# Patient Record
Sex: FEMALE | Race: WHITE | NOT HISPANIC OR LATINO | Employment: UNEMPLOYED | URBAN - NONMETROPOLITAN AREA
[De-identification: names, ages, dates, MRNs, and addresses within clinical notes are randomized per-mention and may not be internally consistent; named-entity substitution may affect disease eponyms.]

---

## 2023-05-18 ENCOUNTER — TRANSCRIBE ORDERS (OUTPATIENT)
Dept: PHYSICAL THERAPY | Facility: CLINIC | Age: 23
End: 2023-05-18

## 2023-05-18 DIAGNOSIS — M25.511 RIGHT SHOULDER PAIN, UNSPECIFIED CHRONICITY: Primary | ICD-10-CM

## 2023-05-23 ENCOUNTER — TREATMENT (OUTPATIENT)
Dept: PHYSICAL THERAPY | Facility: CLINIC | Age: 23
End: 2023-05-23
Payer: COMMERCIAL

## 2023-05-23 DIAGNOSIS — M25.611 DECREASED ROM OF RIGHT SHOULDER: ICD-10-CM

## 2023-05-23 DIAGNOSIS — M25.511 CHRONIC RIGHT SHOULDER PAIN: Primary | ICD-10-CM

## 2023-05-23 DIAGNOSIS — G89.29 CHRONIC RIGHT SHOULDER PAIN: Primary | ICD-10-CM

## 2023-05-23 PROCEDURE — 97162 PT EVAL MOD COMPLEX 30 MIN: CPT | Performed by: PHYSICAL THERAPIST

## 2023-05-23 NOTE — PROGRESS NOTES
Physical Therapy Initial Evaluation and Plan of Care    Patient: Lyudmila Smith   : 2000  Diagnosis/ICD-10 Code:  Chronic right shoulder pain [M25.511, G89.29]  Referring practitioner: CORINNE Navas  Date of Initial Visit: 2023  Today's Date: 2023  Patient seen for 1 session         Visit Diagnoses:    ICD-10-CM ICD-9-CM   1. Chronic right shoulder pain  M25.511 719.41    G89.29 338.29   2. Decreased ROM of right shoulder  M25.611 719.51         Subjective Questionnaire:       Subjective Evaluation    History of Present Illness  Onset date: one year.  Mechanism of injury: Around a year ago the patient began to suffer from right deltoid and right arm pain with no injury.  She reports the pain returned in late 2022 in the right shoulder and right UT region.  The patient was examined by her PCP six weeks ago and was advised to initiate therapy for treatment of pain.  Heavier lifting and overhead reaching increases her pain.      Patient Occupation: unemployed Quality of life: good    Pain  Current pain rating: 3  At best pain ratin  At worst pain ratin  Location: right UT  Quality: burning, dull ache, throbbing, tight and sharp  Relieving factors: medications, rest and change in position  Aggravating factors: keyboarding, lifting, movement, overhead activity, outstretched reach and repetitive movement    Hand dominance: right    Patient Goals  Patient goals for therapy: decreased pain, increased motion, increased strength, independence with ADLs/IADLs and return to sport/leisure activities             Objective          Postural Observations    Additional Postural Observation Details  Fwd head with rounded posture; right UE guarding    Palpation     Additional Palpation Details  Right UT; right pectoral; right deltoid; right cervical paraspinals    Neurological Testing     Sensation     Shoulder   Left Shoulder   Intact: light touch    Right Shoulder   Intact: light  touch    Active Range of Motion   Cervical/Thoracic Spine   Cervical    Flexion: WFL  Extension: WFL  Left lateral flexion: WFL  Right lateral flexion: WFL  Left rotation: WFL  Right rotation: WFL    Right Shoulder   Flexion: 165 degrees with pain  Abduction: 155 degrees with pain  External rotation 90°: 70 degrees  External rotation BTH: with pain  Internal rotation 90°: 70 degrees     Strength/Myotome Testing     Left Shoulder     Planes of Motion   Flexion: 4   Abduction: 4   External rotation at 0°: 4   Internal rotation at 0°: 4     Isolated Muscles   Biceps: 4   Triceps: 4     Right Shoulder     Planes of Motion   Flexion: 3+   Abduction: 4-   External rotation at 0°: 4-   Internal rotation at 0°: 4-     Isolated Muscles   Biceps: 4   Triceps: 4     Additional Strength Details  Supination arom increase right triceps pain    Tests   Cervical   Negative repeated extension and repeated flexion.     Left   Negative Spurling's sign.     Right   Negative Spurling's sign.     Right Shoulder   Positive empty can.   Negative anterior load and shift, Hawkin's, posterior load and shift and Speed's.     Additional Tests Details  Positive right cervical quadrant test for right UT pain          Assessment & Plan     Assessment  Impairments: abnormal muscle firing, abnormal muscle tone, abnormal or restricted ROM, activity intolerance, impaired physical strength, lacks appropriate home exercise program and pain with function  Functional Limitations: carrying objects, lifting, sleeping, pulling, pushing, uncomfortable because of pain, reaching behind back, reaching overhead and unable to perform repetitive tasks  Assessment details: Pt is a 23 y/o female referred to therapy for treatment of right shoulder pain.  Pt presents with symptoms consistent with a right upper trapezius strain.  Therapy will follow for improve postural awareness, UE stability and decreased pain.  Prognosis: good    Goals  Plan Goals: STG 6 weeks    1  Pt will be instructed in a HEP.  2 Pt will report pain no greater than 5/10.  3 Pt will demonstrate 4/5 gross right UE strength.    LTG 12 weeks    1 Pt will improve her Quick Dash to less than 20%.  2 Pt will report pain no greater than 3/10 with moderate lifting.  3 Pt will be independent with a progressive stability program.    Plan  Therapy options: will be seen for skilled therapy services  Planned modality interventions: cryotherapy, thermotherapy (hydrocollator packs), TENS and ultrasound  Planned therapy interventions: abdominal trunk stabilization, ADL retraining, body mechanics training, flexibility, functional ROM exercises, home exercise program, IADL retraining, joint mobilization, manual therapy, motor coordination training, neuromuscular re-education, postural training, soft tissue mobilization, spinal/joint mobilization, strengthening, stretching and therapeutic activities  Frequency: 2x week  Duration in weeks: 12  Treatment plan discussed with: patient  Plan details: Will follow for optimal gains.  Moderate Evaluation  10957  Re-evaluation   45618  Therapeutic exercise  98853  Therapeutic activity    96154  Neuromuscular re-education   04707  Manual therapy   72816  Unattended e-stim (Medicaid/Medicare)     Moist heat/cryotherapy 37412   Ultrasound   57582  Mechanical traction 74064            Timed:         Manual Therapy:         mins  16921;     Therapeutic Exercise:         mins  78947;     Neuromuscular Ana Luisa:        mins  70639;    Therapeutic Activity:          mins  56014;     Gait Training:           mins  91336;     Ultrasound:          mins  32938;    Ionto                                   mins   99474  Self Care                            mins   67692  Canalith Repos         mins 03740      Un-Timed:  Electrical Stimulation:         mins  86108 ( );  Dry Needling          mins self-pay  Traction          mins 17284  Low Eval          Mins  46690  Mod Eval     55     Mins   00554  High Eval                            Mins  10550        Timed Treatment:      mins   Total Treatment:     55   mins          PT: Gael Medley PT     License Number: [id number]  Electronically signed by Gael Medley PT, 05/23/23, 2:36 PM EDT    Certification Period: 5/23/2023 thru 8/20/2023  I certify that the therapy services are furnished while this patient is under my care.  The services outlined above are required by this patient, and will be reviewed every 90 days.         Physician Signature:__________________________________________________    PHYSICIAN: Cheryl Girard PA  NPI: [id number]                                      DATE:      Please sign and return via fax to [email or web address] . Thank you, [facility].

## 2023-05-25 ENCOUNTER — TREATMENT (OUTPATIENT)
Dept: PHYSICAL THERAPY | Facility: CLINIC | Age: 23
End: 2023-05-25
Payer: COMMERCIAL

## 2023-05-25 DIAGNOSIS — G89.29 CHRONIC RIGHT SHOULDER PAIN: Primary | ICD-10-CM

## 2023-05-25 DIAGNOSIS — M25.511 CHRONIC RIGHT SHOULDER PAIN: Primary | ICD-10-CM

## 2023-05-25 DIAGNOSIS — M25.611 DECREASED ROM OF RIGHT SHOULDER: ICD-10-CM

## 2023-05-25 NOTE — PROGRESS NOTES
Physical Therapy Daily Treatment Note      Patient: Lyudmila Smith   : 2000  Referring practitioner: CORINNE Navas  Date of Initial Visit: Type: THERAPY  Noted: 2023  Today's Date: 2023  Patient seen for 2 sessions       Visit Diagnoses:    ICD-10-CM ICD-9-CM   1. Chronic right shoulder pain  M25.511 719.41    G89.29 338.29   2. Decreased ROM of right shoulder  M25.611 719.51       Subjective Evaluation    History of Present Illness    Subjective comment: Patient reports that she has 3/10 pain today.  She notes that she has been performing HEP, but does notice some soreness with scap squeezes.Pain  Current pain rating: 3           Objective   See Exercise, Manual, and Modality Logs for complete treatment.       Assessment & Plan     Assessment    Assessment details: Therapy session initiated with there ex, per flow sheet.  Exercises focused on shoulder ROM, postural awareness, and stretching.  Patient instructed to perform exercises per her tolerance, with patient verbalizing understanding.  Patient slow to perform exercises, requiring cues during session to ensure proper form with exercises for maximum benefits and gains.  STM performed to the right UT, with tenderness and muscle guarding noted.  Session concluded with ice/ESTIM to assist with pain control.  She reported a slight decrease in pain post-tx to 2/10.  Patient will continue to be progressed per her tolerance and POC.          Timed:         Manual Therapy:    15     mins  54587;     Therapeutic Exercise:    25     mins  48848;     Neuromuscular Ana Luisa:        mins  95626;    Therapeutic Activity:          mins  83780;     Gait Training:           mins  16360;     Ultrasound:          mins  07910;    Ionto                                   mins   23493  Self Care                            mins   94347      Un-Timed:  Electrical Stimulation:    10     mins  53102 ( );  Dry Needling          mins self-pay  Traction           mins 52284  [facility]         mins 56359    Timed Treatment:   40   mins   Total Treatment:     50   mins    Rose Mohr, PT  [place] License: [id number]  Electronically signed by Rose Mohr PT, 05/25/23, 2:54 PM EDT.

## 2023-05-31 ENCOUNTER — TREATMENT (OUTPATIENT)
Dept: PHYSICAL THERAPY | Facility: CLINIC | Age: 23
End: 2023-05-31

## 2023-05-31 DIAGNOSIS — M25.511 CHRONIC RIGHT SHOULDER PAIN: Primary | ICD-10-CM

## 2023-05-31 DIAGNOSIS — G89.29 CHRONIC RIGHT SHOULDER PAIN: Primary | ICD-10-CM

## 2023-05-31 DIAGNOSIS — M25.611 DECREASED ROM OF RIGHT SHOULDER: ICD-10-CM

## 2023-05-31 NOTE — PROGRESS NOTES
Physical Therapy Daily Treatment Note      Patient: Lyudmila Smith   : 2000  Referring practitioner: CORINNE Navas  Date of Initial Visit: Type: THERAPY  Noted: 2023  Today's Date: 2023  Patient seen for 3 sessions       Visit Diagnoses:    ICD-10-CM ICD-9-CM   1. Chronic right shoulder pain  M25.511 719.41    G89.29 338.29   2. Decreased ROM of right shoulder  M25.611 719.51       Subjective Evaluation    History of Present Illness    Subjective comment: Pt has 1/10 right shoulder pain today       Objective   See Exercise, Manual, and Modality Logs for complete treatment.       Assessment & Plan     Assessment    Assessment details: Tx today consisted of exercises for improved cervical ROM and scapular stability with added pulleys and mid rows with rtb; followed by stm to right UT and shld and ended with ice and estim for decreased pain.  Pt demonstrated good effort today with 2/10 post pain.    Plan  Plan details: Will follow progressing shoulder stability and improved function with decreased pain.          Timed:         Manual Therapy:    15     mins  71107;     Therapeutic Exercise:    25     mins  45765;     Neuromuscular Ana Luisa:        mins  38758;    Therapeutic Activity:          mins  48473;     Gait Training:           mins  80189;     Ultrasound:          mins  65205;    Ionto                                   mins   13711  Self Care                            mins   47145  Canalith Repos         mins 80279      Un-Timed:  Electrical Stimulation:    10     mins  95066 ( );  Dry Needling          mins self-pay  Traction          mins 22681      Timed Treatment:   40   mins   Total Treatment:     50   mins    Gael Medley PT  [place] License: [id number]      Electronically signed by Gael Medley PT, 23, 2:32 PM EDT

## 2023-06-01 ENCOUNTER — TREATMENT (OUTPATIENT)
Dept: PHYSICAL THERAPY | Facility: CLINIC | Age: 23
End: 2023-06-01

## 2023-06-01 DIAGNOSIS — G89.29 CHRONIC RIGHT SHOULDER PAIN: Primary | ICD-10-CM

## 2023-06-01 DIAGNOSIS — M25.611 DECREASED ROM OF RIGHT SHOULDER: ICD-10-CM

## 2023-06-01 DIAGNOSIS — M25.511 CHRONIC RIGHT SHOULDER PAIN: Primary | ICD-10-CM

## 2023-06-01 NOTE — PROGRESS NOTES
Physical Therapy Daily Treatment Note      Patient: Lyudmila Smith   : 2000  Referring practitioner: CORINNE Navas  Date of Initial Visit: Type: THERAPY  Noted: 2023  Today's Date: 2023  Patient seen for 4 sessions       Visit Diagnoses:    ICD-10-CM ICD-9-CM   1. Chronic right shoulder pain  M25.511 719.41    G89.29 338.29   2. Decreased ROM of right shoulder  M25.611 719.51       Subjective Evaluation    History of Present Illness    Subjective comment: Pt has 2/10 pain today.       Objective   See Exercise, Manual, and Modality Logs for complete treatment.       Assessment & Plan     Assessment    Assessment details: Tx today consisted of exercises progressed with increased reps and added scaption for pulley and lat rows; followed by stm to UT and ended with ice and estim for decreased pain.  Pt demonstrated good effort with mild pain with lat pulls only.  Pt reported 1/10 post pain.    Plan  Plan details: Will follow progressing shoulder mobility and decreased pain.          Timed:         Manual Therapy:    15     mins  21640;     Therapeutic Exercise:    24     mins  63430;     Neuromuscular Ana Luisa:        mins  91402;    Therapeutic Activity:          mins  40946;     Gait Training:           mins  80595;     Ultrasound:          mins  86812;    Ionto                                   mins   36528  Self Care                            mins   30049  Canalith Repos         mins 17138      Un-Timed:  Electrical Stimulation:    10     mins  10120 ( );  Dry Needling          mins self-pay  Traction          mins 02524      Timed Treatment:   39   mins   Total Treatment:     49   mins    Gael Medley PT  [place] License: [id number]      Electronically signed by Gael Medley PT, 23, 1:30 PM EDT

## 2023-06-06 ENCOUNTER — TREATMENT (OUTPATIENT)
Dept: PHYSICAL THERAPY | Facility: CLINIC | Age: 23
End: 2023-06-06
Payer: COMMERCIAL

## 2023-06-06 DIAGNOSIS — M25.611 DECREASED ROM OF RIGHT SHOULDER: ICD-10-CM

## 2023-06-06 DIAGNOSIS — M25.511 CHRONIC RIGHT SHOULDER PAIN: Primary | ICD-10-CM

## 2023-06-06 DIAGNOSIS — G89.29 CHRONIC RIGHT SHOULDER PAIN: Primary | ICD-10-CM

## 2023-06-06 NOTE — PROGRESS NOTES
Physical Therapy Daily Treatment Note      Patient: Lyudmila Smith   : 2000  Referring practitioner: CORINNE Navas  Date of Initial Visit: Type: THERAPY  Noted: 2023  Today's Date: 2023  Patient seen for 5 sessions       Visit Diagnoses:    ICD-10-CM ICD-9-CM   1. Chronic right shoulder pain  M25.511 719.41    G89.29 338.29   2. Decreased ROM of right shoulder  M25.611 719.51       Subjective Evaluation    History of Present Illness    Subjective comment: Patient reports 3/10 pain today.  She states that pain has seems to be around her shoulder blade more.Pain  Current pain rating: 3         Objective   See Exercise, Manual, and Modality Logs for complete treatment.       Assessment & Plan     Assessment    Assessment details: Patient tolerated today's session well, noting a decrease in pain at conclusion of session.  She reported 1/10 pain post-tx.  Therapy session initiated with MH/ESTIM, followed by there ex and manual therapy.  There ex progressed to include increased repetitions and the addition of lowrows with theraband.  Tenderness reported at subscap during STM.  Treatment concluded with cryotherapy.  She will continue to be progressed per her tolerance and POC.      Timed:         Manual Therapy:    13     mins  60909;     Therapeutic Exercise:    30     mins  45490;     Neuromuscular Ana Luisa:        mins  68223;    Therapeutic Activity:          mins  58322;     Gait Training:           mins  68668;     Ultrasound:          mins  24126;    Ionto                                   mins   29810  Self Care                            mins   25145      Un-Timed:  Electrical Stimulation:    10     mins  68162 ( );  Dry Needling          mins self-pay  Traction          mins 58235  Canalith Repos         mins 19092  Ice-6 min    Timed Treatment:   43   mins   Total Treatment:     59   mins    Rose Mohr PT  [place] License: [id number]  Electronically signed by Rose Mohr PT,  06/06/23, 3:51 PM EDT.

## 2023-06-08 ENCOUNTER — TREATMENT (OUTPATIENT)
Dept: PHYSICAL THERAPY | Facility: CLINIC | Age: 23
End: 2023-06-08
Payer: COMMERCIAL

## 2023-06-08 DIAGNOSIS — M25.511 CHRONIC RIGHT SHOULDER PAIN: Primary | ICD-10-CM

## 2023-06-08 DIAGNOSIS — M25.611 DECREASED ROM OF RIGHT SHOULDER: ICD-10-CM

## 2023-06-08 DIAGNOSIS — G89.29 CHRONIC RIGHT SHOULDER PAIN: Primary | ICD-10-CM

## 2023-06-08 NOTE — PROGRESS NOTES
Physical Therapy Daily Treatment Note      Patient: Lyudmila Smith   : 2000  Referring practitioner: CORINNE Navas  Date of Initial Visit: Type: THERAPY  Noted: 2023  Today's Date: 2023  Patient seen for 6 sessions       Visit Diagnoses:    ICD-10-CM ICD-9-CM   1. Chronic right shoulder pain  M25.511 719.41    G89.29 338.29   2. Decreased ROM of right shoulder  M25.611 719.51       Subjective Evaluation    History of Present Illness    Subjective comment: Pt has 3/10 pain today.     Objective   See Exercise, Manual, and Modality Logs for complete treatment.       Assessment & Plan     Assessment    Assessment details: Tx today consisted of exercises for improved cervical mobility and postural stability; followed by stm to right UT and right mid trap region; followed by estim and ice for decreased pain.  Pt reported mild distress, yet demonstrated no change in pain during session today.    Plan  Plan details: Will follow progressing core stability and decreased pain.  May cont to discharge next session based on patient's decision.      Timed:         Manual Therapy:    13     mins  01873;     Therapeutic Exercise:    15     mins  16847;     Neuromuscular Ana Luisa:        mins  74447;    Therapeutic Activity:          mins  26667;     Gait Training:           mins  17567;     Ultrasound:          mins  94339;    Ionto                                   mins   49700  Self Care                            mins   13744  Canalith Repos         mins 68010      Un-Timed:  Electrical Stimulation:    10     mins  56363 ( );  Dry Needling          mins self-pay  Traction          mins 97098      Timed Treatment:   28   mins   Total Treatment:     38   mins    Gael Medley PT  [place] License: [id number]      Electronically signed by Gael Medley PT, 23, 2:01 PM EDT

## 2023-06-13 ENCOUNTER — TREATMENT (OUTPATIENT)
Dept: PHYSICAL THERAPY | Facility: CLINIC | Age: 23
End: 2023-06-13
Payer: COMMERCIAL

## 2023-06-13 DIAGNOSIS — M25.611 DECREASED ROM OF RIGHT SHOULDER: ICD-10-CM

## 2023-06-13 DIAGNOSIS — G89.29 CHRONIC RIGHT SHOULDER PAIN: Primary | ICD-10-CM

## 2023-06-13 DIAGNOSIS — M25.511 CHRONIC RIGHT SHOULDER PAIN: Primary | ICD-10-CM

## 2023-06-13 NOTE — PROGRESS NOTES
Physical Therapy Daily Treatment Note/Discharge      Patient: Lyudmila Smith   : 2000  Referring practitioner: CORINNE Navas  Date of Initial Visit: Type: THERAPY  Noted: 2023  Today's Date: 2023  Patient seen for 7 sessions       Visit Diagnoses:    ICD-10-CM ICD-9-CM   1. Chronic right shoulder pain  M25.511 719.41    G89.29 338.29   2. Decreased ROM of right shoulder  M25.611 719.51       Subjective Evaluation    History of Present Illness    Subjective comment: Pt has 3/10 pain today.  Pt has requested to be discahrged today.     Objective   See Exercise, Manual, and Modality Logs for complete treatment.       Assessment & Plan     Assessment    Assessment details: Tx today consisted of DC review and HEP review followed by stm to right UT and ended with ice and estim. Pt demonstrated good understanding of HEP and reported 2/10 post pain.    Plan  Plan details: Will discharge.  Pt instructed to contact therapy as needed.        Timed:         Manual Therapy:    13     mins  17291;     Therapeutic Exercise:    14     mins  66357;     Neuromuscular Ana Luisa:        mins  49600;    Therapeutic Activity:          mins  80231;     Gait Training:           mins  06274;     Ultrasound:          mins  24556;    Ionto                                   mins   55848  Self Care                            mins   43761  Canalith Repos         mins 66442      Un-Timed:  Electrical Stimulation:    10     mins  65921 ( );  Dry Needling          mins self-pay  Traction          mins 99409      Timed Treatment:   27   mins   Total Treatment:     37   mins    Gael Medley PT  [place] License: [id number]      Electronically signed by Gael Medley PT, 23, 1:46 PM EDT

## 2023-07-26 ENCOUNTER — TRANSCRIBE ORDERS (OUTPATIENT)
Dept: ADMINISTRATIVE | Facility: HOSPITAL | Age: 23
End: 2023-07-26
Payer: COMMERCIAL

## 2023-07-26 ENCOUNTER — HOSPITAL ENCOUNTER (OUTPATIENT)
Dept: GENERAL RADIOLOGY | Facility: HOSPITAL | Age: 23
Discharge: HOME | End: 2023-07-26
Admitting: PHYSICIAN ASSISTANT
Payer: COMMERCIAL

## 2023-07-26 DIAGNOSIS — M25.511 RIGHT SHOULDER PAIN, UNSPECIFIED CHRONICITY: ICD-10-CM

## 2023-07-26 DIAGNOSIS — M25.511 RIGHT SHOULDER PAIN, UNSPECIFIED CHRONICITY: Primary | ICD-10-CM

## 2023-07-26 PROCEDURE — 73030 X-RAY EXAM OF SHOULDER: CPT | Performed by: RADIOLOGY

## 2023-07-26 PROCEDURE — 73030 X-RAY EXAM OF SHOULDER: CPT

## 2023-08-11 ENCOUNTER — HOSPITAL ENCOUNTER (OUTPATIENT)
Dept: MRI IMAGING | Facility: HOSPITAL | Age: 23
Discharge: HOME | End: 2023-08-11
Admitting: PHYSICIAN ASSISTANT
Payer: COMMERCIAL

## 2023-08-11 DIAGNOSIS — M25.511 RIGHT SHOULDER PAIN, UNSPECIFIED CHRONICITY: ICD-10-CM

## 2023-08-11 PROCEDURE — 73221 MRI JOINT UPR EXTREM W/O DYE: CPT

## 2023-09-14 ENCOUNTER — OFFICE VISIT (OUTPATIENT)
Dept: ORTHOPEDIC SURGERY | Facility: CLINIC | Age: 23
End: 2023-09-14
Payer: COMMERCIAL

## 2023-09-14 VITALS
SYSTOLIC BLOOD PRESSURE: 133 MMHG | HEIGHT: 62 IN | WEIGHT: 160 LBS | DIASTOLIC BLOOD PRESSURE: 87 MMHG | HEART RATE: 73 BPM | BODY MASS INDEX: 29.44 KG/M2

## 2023-09-14 DIAGNOSIS — G25.89 SCAPULAR DYSKINESIS: Primary | ICD-10-CM

## 2023-09-14 DIAGNOSIS — M75.41 IMPINGEMENT SYNDROME OF RIGHT SHOULDER: ICD-10-CM

## 2023-09-14 PROCEDURE — 99203 OFFICE O/P NEW LOW 30 MIN: CPT | Performed by: PHYSICIAN ASSISTANT

## 2023-09-14 RX ORDER — METHOCARBAMOL 750 MG/1
TABLET, FILM COATED ORAL
COMMUNITY
Start: 2023-08-29

## 2023-09-14 RX ORDER — NAPROXEN 500 MG/1
500 TABLET ORAL 2 TIMES DAILY WITH MEALS
Qty: 60 TABLET | Refills: 2 | Status: SHIPPED | OUTPATIENT
Start: 2023-09-14

## 2023-09-14 RX ORDER — NORELGESTROMIN AND ETHINYL ESTRADIOL 35; 150 UG/D; UG/D
PATCH TRANSDERMAL
COMMUNITY
Start: 2023-08-21

## 2023-09-14 NOTE — PROGRESS NOTES
[facility] Orthopaedic Surgery New Patient Visit          Patient: Lyudmila Smith  YOB: 2000  Date of Encounter: 09/14/2023  PCP: Cheryl Girard PA      Subjective     Chief Complaint   Patient presents with    Right Shoulder - Pain, Initial Evaluation           History of Present Illness:     Lyudmila Smith is a 23 y.o. female presents today as result of periscapular posterior right shoulder pain no notable injury approximate 12 to 18 months onset.  Patient states that she had continued pain to the basicervical region of the neck radiating down to the posterior shoulder and superior shoulder.  The patient states that this radiates down the arm and occasionally into the elbow.  Patient reports that she continues to have difficulty upon range of motion and repetitive activity with weakness and difficulty with lifting, pulling, lying on the affected side.  Patient has undergone temporary formal outpatient physical therapy to which she is only approved for 6 visits and she has not gone back since.  She reports dull throbbing aching sensation and popping sensation upon repetitive range of motion and activity.  Denies any paresthesias        There is no problem list on file for this patient.    History reviewed. No pertinent past medical history.  History reviewed. No pertinent surgical history.  Social History     Occupational History    Not on file   Tobacco Use    Smoking status: Never    Smokeless tobacco: Never   Vaping Use    Vaping Use: Never used   Substance and Sexual Activity    Alcohol use: Not Currently     Comment: 4 months sober    Drug use: Never    Sexual activity: Defer    Lyudmila Smith  reports that she has never smoked. She has never used smokeless tobacco.. I have educated her on the risk of diseases from using tobacco products such as cancer, COPD, and heart disease.           Social History     Social History Narrative    Not on file     History reviewed. No pertinent family  "history.  Current Outpatient Medications   Medication Sig Dispense Refill    methocarbamol (ROBAXIN) 750 MG tablet       Zafemy 150-35 MCG/24HR APPLY 1 PATCH TOPICALLY TO THE SKIN 1 TIME WEEKLY      naproxen (NAPROSYN) 500 MG tablet Take 1 tablet by mouth 2 (Two) Times a Day With Meals. 60 tablet 2     No current facility-administered medications for this visit.     No Known Allergies         Review of Systems   Constitutional: Negative.   HENT: Negative.     Eyes: Negative.    Cardiovascular: Negative.    Respiratory: Negative.     Endocrine: Negative.    Hematologic/Lymphatic: Negative.    Skin: Negative.    Musculoskeletal:         Pertinent positives listed in HPI   Gastrointestinal: Negative.    Genitourinary: Negative.    Neurological: Negative.    Psychiatric/Behavioral:  Positive for depression. The patient has insomnia and is nervous/anxious.    Allergic/Immunologic: Negative.        Objective      Vitals:    09/14/23 0941   BP: 133/87   Pulse: 73   Weight: 72.6 kg (160 lb)   Height: 157.5 cm (62\")      BMI is >= 25 and <30. (Overweight) The following options were offered after discussion;: exercise counseling/recommendations and nutrition counseling/recommendations      Physical Exam  Vitals and nursing note reviewed.   Constitutional:       General: She is not in acute distress.     Appearance: She is not ill-appearing.   HENT:      Head: Normocephalic and atraumatic.      Right Ear: External ear normal.      Left Ear: External ear normal.      Nose: Nose normal. No congestion or rhinorrhea.   Eyes:      Extraocular Movements: Extraocular movements intact.      Conjunctiva/sclera: Conjunctivae normal.      Pupils: Pupils are equal, round, and reactive to light.   Cardiovascular:      Rate and Rhythm: Normal rate.      Pulses: Normal pulses.   Pulmonary:      Effort: Pulmonary effort is normal. No respiratory distress.      Breath sounds: No stridor.   Abdominal:      General: There is no distension. "   Musculoskeletal:      Cervical back: Normal range of motion.   Skin:     General: Skin is warm and dry.      Capillary Refill: Capillary refill takes less than 2 seconds.   Neurological:      General: No focal deficit present.      Mental Status: She is alert and oriented to person, place, and time.   Psychiatric:         Mood and Affect: Mood normal.         Behavior: Behavior normal.         Thought Content: Thought content normal.         Judgment: Judgment normal.   Right Shoulder Exam     Tenderness   The patient is experiencing tenderness in the acromioclavicular joint (Medial inferior scapular border).    Range of Motion   Active abduction:  abnormal   Passive abduction:  abnormal   Extension:  normal   External rotation:  abnormal   Forward flexion:  abnormal   Internal rotation 0 degrees:  Mid thoracic (Scapular winging)     Muscle Strength   Abduction: 5/5   Internal rotation: 5/5   External rotation: 5/5   Supraspinatus: 5/5   Subscapularis: 5/5   Biceps: 5/5     Tests   Apprehension: positive  Lopez test: positive  Cross arm: negative  Impingement: positive  Drop arm: negative  Sulcus: absent    Other   Erythema: absent  Scars: absent  Sensation: normal  Pulse: present    Comments:  Crepitus and scapular dyskinesis with attempted forward elevation and internal rotation.  Scapular winging present.  Neurovascular status grossly intact right upper extremity                    Radiology:      XR Shoulder 2+ View Right    Result Date: 7/26/2023    No acute findings in the right shoulder.  This report was finalized on 7/26/2023 4:11 PM by Dr. Reginaldo Spicer MD.      MRI Shoulder Right Without Contrast    Result Date: 8/11/2023  1.  No acute osseous or articular abnormality. 2.  No acute rotator cuff pathology. 3.  No acute labral pathology. 4.  No bone contusion or fracture.  This report was finalized on 8/11/2023 10:17 AM by Dr. Horace Flores MD.             Assessment/Plan        ICD-10-CM ICD-9-CM   1.  Scapular dyskinesis  G25.89 781.3   2. Impingement syndrome of right shoulder  M75.41 726.2       23-year-old female with notable scapular dyskinesis.  The patient was provided information yielding no direct surgical indication with MRI revealing no acute osseous or articular abnormality.  Rotator cuff pathology is ruled out.  There is no acute labral pathology.  Patient will begin to work with formal outpatient physical therapy with specific emphasis on increasing scapular mobility and decreasing the dyskinesis.  Patient will implement NSAIDs and will return back upon completion of physical therapy for further evaluation.                      This document was signed by Damon Painter PA-C 9/14/2023    CC: Cheryl Girard PA      Dictated Utilizing Dragon Dictation:   Please note that portions of this note were completed with a voice recognition program.   Part of this note may be an electronic transcription/translation of spoken language to printed text using the Dragon Dictation System.

## 2023-09-25 ENCOUNTER — TREATMENT (OUTPATIENT)
Dept: PHYSICAL THERAPY | Facility: CLINIC | Age: 23
End: 2023-09-25

## 2023-09-25 DIAGNOSIS — M25.511 CHRONIC RIGHT SHOULDER PAIN: ICD-10-CM

## 2023-09-25 DIAGNOSIS — G25.89 SCAPULAR DYSKINESIS: Primary | ICD-10-CM

## 2023-09-25 DIAGNOSIS — M25.611 DECREASED ROM OF RIGHT SHOULDER: ICD-10-CM

## 2023-09-25 DIAGNOSIS — G89.29 CHRONIC RIGHT SHOULDER PAIN: ICD-10-CM

## 2023-09-25 PROCEDURE — 97162 PT EVAL MOD COMPLEX 30 MIN: CPT | Performed by: PHYSICAL THERAPIST

## 2023-09-25 NOTE — PROGRESS NOTES
Physical Therapy Initial Evaluation and Plan of Care    Patient: Lyudmila Smith   : 2000  Diagnosis/ICD-10 Code:  Scapular dyskinesis [G25.89]  Referring practitioner: CORINNE Mcadams  Date of Initial Visit: 2023  Today's Date: 2023  Patient seen for 1 session         Visit Diagnoses:    ICD-10-CM ICD-9-CM   1. Scapular dyskinesis  G25.89 781.3   2. Chronic right shoulder pain  M25.511 719.41    G89.29 338.29   3. Decreased ROM of right shoulder  M25.611 719.51         Subjective Questionnaire: QuickDASH: 54%      Subjective Evaluation    History of Present Illness  Onset date: 2022.  Mechanism of injury: The patient has suffered from right shoulder pain since 2022 with no known cause.  The patient has increased pain with overhead reaching, lifting, washing her back and reaching backward.  She was referred to [facility] two weeks ago and was diagnosed with scapular dyskinesis. The patient has been advised to start therapy for improved mobility and stability.    Quality of life: good    Pain  Current pain ratin  At best pain rating: 3  At worst pain ratin  Location: right shoulder  Quality: burning, dull ache and sharp  Relieving factors: medications and rest  Aggravating factors: lifting, movement, overhead activity, prolonged positioning, outstretched reach and repetitive movement    Hand dominance: right    Patient Goals  Patient goals for therapy: decreased pain, increased motion, increased strength, independence with ADLs/IADLs and return to sport/leisure activities           Objective          Postural Observations    Additional Postural Observation Details  Right UE guarding;     Palpation     Additional Palpation Details  Right UT; right scapular region; right bicipital groove    Cervical/Thoracic Screen   Cervical range of motion within normal limits    Neurological Testing     Sensation     Shoulder   Left Shoulder   Intact: light touch    Right Shoulder    Intact: Light touch    Active Range of Motion   Left Shoulder   Flexion: WFL  Abduction: WFL  External rotation 90°: WFL  Internal rotation 90°: WFL    Right Shoulder   Flexion: 135 degrees   Abduction: 70 degrees   External rotation 90°: 30 degrees  Internal rotation 90°: 30 degrees     Strength/Myotome Testing     Left Shoulder     Planes of Motion   Flexion: 4+   Abduction: 4+     Isolated Muscles   Biceps: 4+   Triceps: 4+     Right Shoulder     Planes of Motion   Flexion: 3+   Abduction: 3+   External rotation at 0°: 4-   Internal rotation at 0°: 4-     Isolated Muscles   Biceps: 4-   Triceps: 4-     Tests     Right Shoulder   Positive empty can, Hawkin's and painful arc.   Negative anterior load and shift, drop arm and posterior load and shift.         Assessment & Plan       Assessment  Impairments: abnormal coordination, abnormal muscle firing, abnormal muscle tone, abnormal or restricted ROM, activity intolerance, impaired physical strength, lacks appropriate home exercise program, pain with function and safety issue   Functional limitations: carrying objects, lifting, sleeping, pulling, pushing, uncomfortable because of pain, reaching behind back, reaching overhead and unable to perform repetitive tasks   Assessment details: Pt is a 22 y/o female referred to therapy for treatment of right shoulder pain and scapular dyskinesis.  Pt is noted to have decreased shoulder ROM, strength and increased pain.  Therapy will follow for improved functional mobility and decreased pain.  Prognosis: good    Goals  Plan Goals: STG 6 weeks    1 Pt will be instructed in a HEP.  2 Pt will improve her Quick Dash to less than 40%.  3 Pt will improve her right shoulder AROM to 120 degrees.    LTG 12 weeks    1 Pt will improve Quick Dash to less than 20%.  2 Pt will report pain no greater than 4/10 with moderate lifting.  3 Pt will demonstrate 140 degrees of AROM for right shoulder flexion.  4 Pt will demonstrate 4/5 gross  right UE strength.    Plan  Therapy options: will be seen for skilled therapy services  Planned modality interventions: cryotherapy, TENS, thermotherapy (hydrocollator packs) and ultrasound  Planned therapy interventions: ADL retraining, body mechanics training, flexibility, functional ROM exercises, home exercise program, IADL retraining, joint mobilization, manual therapy, neuromuscular re-education, postural training, soft tissue mobilization, strengthening, stretching and therapeutic activities  Frequency: 2x week  Duration in weeks: 12  Treatment plan discussed with: patient  Plan details: Will follow for optimal gains.    Moderate Evaluation  19345    Re-evaluation   61529      Therapeutic exercise  20746  Therapeutic activity    05850  Neuromuscular re-education   22685  Manual therapy   66049  Gait training  91966  Unattended e-stim (Medicaid/Medicare)     Moist heat/cryotherapy 57451   Ultrasound   69243          Timed:         Manual Therapy:         mins  94450;     Therapeutic Exercise:         mins  40732;     Neuromuscular Ana Luisa:        mins  19189;    Therapeutic Activity:          mins  77959;     Gait Training:           mins  89413;     Ultrasound:          mins  38448;    Ionto                                   mins   89914  Self Care                            mins   37599  Canalith Repos         mins 54820      Un-Timed:  Electrical Stimulation:         mins  13334 ( );  Dry Needling          mins self-pay  Traction          mins 56190  Low Eval          Mins  42657  Mod Eval     60     Mins  86936  High Eval                            Mins  20291        Timed Treatment:      mins   Total Treatment:     60   mins          PT: Gael Medley PT     License Number: [id number]  Electronically signed by Gael Medley PT, 09/25/23, 2:58 PM EDT    Certification Period: 9/25/2023 thru 12/23/2023  I certify that the therapy services are furnished while this patient is under my  care.  The services outlined above are required by this patient, and will be reviewed every 90 days.         Physician Signature:__________________________________________________    PHYSICIAN: Damon Painter PA  NPI: [id number]                                      DATE:      Please sign and return via fax to [email or web address] . Thank you, [facility].

## 2023-09-28 ENCOUNTER — TREATMENT (OUTPATIENT)
Dept: PHYSICAL THERAPY | Facility: CLINIC | Age: 23
End: 2023-09-28
Payer: COMMERCIAL

## 2023-09-28 DIAGNOSIS — G25.89 SCAPULAR DYSKINESIS: Primary | ICD-10-CM

## 2023-09-28 DIAGNOSIS — M25.611 DECREASED ROM OF RIGHT SHOULDER: ICD-10-CM

## 2023-09-28 DIAGNOSIS — M25.511 CHRONIC RIGHT SHOULDER PAIN: ICD-10-CM

## 2023-09-28 DIAGNOSIS — G89.29 CHRONIC RIGHT SHOULDER PAIN: ICD-10-CM

## 2023-09-28 NOTE — PROGRESS NOTES
Physical Therapy Daily Treatment Note      Patient: Lyudmila Smith   : 2000  Referring practitioner: CORINNE Mcadams  Date of Initial Visit: Type: THERAPY  Noted: 2023  Today's Date: 2023  Patient seen for 8 sessions       Visit Diagnoses:    ICD-10-CM ICD-9-CM   1. Scapular dyskinesis  G25.89 781.3   2. Chronic right shoulder pain  M25.511 719.41    G89.29 338.29   3. Decreased ROM of right shoulder  M25.611 719.51       Subjective Evaluation    History of Present Illness    Subjective comment: Pt has 4/10 pain today in the right shld.     Objective   See Exercise, Manual, and Modality Logs for complete treatment.       Assessment & Plan       Assessment  Assessment details: Pt tx today consisted of exercises for improved shoulder mobility and postural stability; followed by stm to right lateral deltoid and ended with ice and estim for decreased pain.  Pt demonstrated good effort with cues for TB exercises.  Pt reported 2/10 post pain.    Plan  Plan details: Will follow progressing shoulder mobility and postural stability.        Timed:         Manual Therapy:    13     mins  84645;     Therapeutic Exercise:    28     mins  09957;     Neuromuscular Ana Luisa:        mins  33232;    Therapeutic Activity:          mins  92289;     Gait Training:           mins  79671;     Ultrasound:          mins  85725;    Ionto                                   mins   58030  Self Care                            mins   53702  Canalith Repos         mins 87426      Un-Timed:  Electrical Stimulation:    10     mins  19138 ( );  Dry Needling          mins self-pay  Traction          mins 11804      Timed Treatment:   41   mins   Total Treatment:     51   mins    Gael Medley PT  [place] License: [id number]      Electronically signed by Gael Medley PT, 23, 10:59 AM EDT

## 2023-10-03 ENCOUNTER — TREATMENT (OUTPATIENT)
Dept: PHYSICAL THERAPY | Facility: CLINIC | Age: 23
End: 2023-10-03
Payer: COMMERCIAL

## 2023-10-03 DIAGNOSIS — M25.511 CHRONIC RIGHT SHOULDER PAIN: ICD-10-CM

## 2023-10-03 DIAGNOSIS — G89.29 CHRONIC RIGHT SHOULDER PAIN: ICD-10-CM

## 2023-10-03 DIAGNOSIS — G25.89 SCAPULAR DYSKINESIS: Primary | ICD-10-CM

## 2023-10-03 DIAGNOSIS — M25.611 DECREASED ROM OF RIGHT SHOULDER: ICD-10-CM

## 2023-10-03 NOTE — PROGRESS NOTES
Physical Therapy Daily Treatment Note      Patient: Lyudmila Smith   : 2000  Referring practitioner: CORINNE Mcadams  Date of Initial Visit: Type: THERAPY  Noted: 2023  Today's Date: 10/3/2023  Patient seen for 2 sessions       Visit Diagnoses:    ICD-10-CM ICD-9-CM   1. Scapular dyskinesis  G25.89 781.3   2. Chronic right shoulder pain  M25.511 719.41    G89.29 338.29   3. Decreased ROM of right shoulder  M25.611 719.51       Subjective Evaluation    History of Present Illness    Subjective comment: Pt has 7/10 pain today.     Objective   See Exercise, Manual, and Modality Logs for complete treatment.       Assessment & Plan       Assessment  Assessment details: Pt tx today consisted of exercises for improved shoulder mobility and postural stability; followed by stm to right lateral deltoid and ended with ice and estim for decreased pain.  Pt responded well to tx today with good response to added reps with postural exercises.  Pt reported 4/10 post pain.    Plan  Plan details: Will follow progressing shoulder mobility and postural stability.        Timed:         Manual Therapy:    13     mins  48550;     Therapeutic Exercise:    25     mins  14298;     Neuromuscular Ana Luisa:        mins  86450;    Therapeutic Activity:          mins  01665;     Gait Training:           mins  17886;     Ultrasound:          mins  43005;    Ionto                                   mins   45189  Self Care                            mins   58616  Canalith Repos         mins 60843      Un-Timed:  Electrical Stimulation:    10     mins  96458 ( );  Dry Needling          mins self-pay  Traction          mins 76778      Timed Treatment:   38   mins   Total Treatment:     48   mins    Gael Medley PT  [place] License: [id number]      Electronically signed by Gael Medley PT, 10/03/23, 10:59 AM EDT

## 2023-10-05 ENCOUNTER — TREATMENT (OUTPATIENT)
Dept: PHYSICAL THERAPY | Facility: CLINIC | Age: 23
End: 2023-10-05
Payer: COMMERCIAL

## 2023-10-05 DIAGNOSIS — G25.89 SCAPULAR DYSKINESIS: Primary | ICD-10-CM

## 2023-10-05 DIAGNOSIS — G89.29 CHRONIC RIGHT SHOULDER PAIN: ICD-10-CM

## 2023-10-05 DIAGNOSIS — M25.511 CHRONIC RIGHT SHOULDER PAIN: ICD-10-CM

## 2023-10-05 NOTE — PROGRESS NOTES
Physical Therapy Daily Treatment Note      Patient: Lyudmila Smith   : 2000  Referring practitioner: CORINNE Mcadams  Date of Initial Visit: Type: THERAPY  Noted: 2023  Today's Date: 10/5/2023  Patient seen for 3 sessions       Visit Diagnoses:    ICD-10-CM ICD-9-CM   1. Scapular dyskinesis  G25.89 781.3   2. Chronic right shoulder pain  M25.511 719.41    G89.29 338.29       Subjective Evaluation    History of Present Illness    Subjective comment: Pt has 8/10 pain today.  Pt is uncertain on why her pain is greater today.     Objective   See Exercise, Manual, and Modality Logs for complete treatment.       Assessment & Plan       Assessment  Assessment details: Pt tx today consisted of mh and estim to right shld followed by exercises for improved postural stability and shld mobility and ended with stm to right UT and ice.  Pt demonstrated good effort and requested to hold TB exercises today due to increased pain.  Pt reported 5/10 pain.    Plan  Plan details: Will follow progressing shld mobility and stability.        Timed:         Manual Therapy:    12     mins  33368;     Therapeutic Exercise:    17     mins  57889;     Neuromuscular Ana Luisa:        mins  25235;    Therapeutic Activity:          mins  72977;     Gait Training:           mins  53822;     Ultrasound:          mins  64436;    Ionto                                   mins   70897  Self Care                            mins   44274  Canalith Repos         mins 86133      Un-Timed:  Electrical Stimulation:    15     mins  28196 ( );  Dry Needling          mins self-pay  Traction          mins 82516      Timed Treatment:   29   mins   Total Treatment:     50   mins(6 min ice)    Gael Medley PT  [place] License: [id number]      Electronically signed by Gael Medley PT, 10/05/23, 11:18 AM EDT

## 2023-10-10 ENCOUNTER — TREATMENT (OUTPATIENT)
Dept: PHYSICAL THERAPY | Facility: CLINIC | Age: 23
End: 2023-10-10
Payer: COMMERCIAL

## 2023-10-10 DIAGNOSIS — G89.29 CHRONIC RIGHT SHOULDER PAIN: ICD-10-CM

## 2023-10-10 DIAGNOSIS — M25.611 DECREASED ROM OF RIGHT SHOULDER: ICD-10-CM

## 2023-10-10 DIAGNOSIS — G25.89 SCAPULAR DYSKINESIS: Primary | ICD-10-CM

## 2023-10-10 DIAGNOSIS — M25.511 CHRONIC RIGHT SHOULDER PAIN: ICD-10-CM

## 2023-10-10 PROCEDURE — 97140 MANUAL THERAPY 1/> REGIONS: CPT | Performed by: PHYSICAL THERAPIST

## 2023-10-10 PROCEDURE — 97110 THERAPEUTIC EXERCISES: CPT | Performed by: PHYSICAL THERAPIST

## 2023-10-10 PROCEDURE — 97014 ELECTRIC STIMULATION THERAPY: CPT | Performed by: PHYSICAL THERAPIST

## 2023-10-10 NOTE — PROGRESS NOTES
Physical Therapy Daily Treatment Note      Patient: Lyudmila Smith   : 2000  Referring practitioner: CORINNE Mcadams  Date of Initial Visit: Type: THERAPY  Noted: 2023  Today's Date: 10/10/2023  Patient seen for 4 sessions       Visit Diagnoses:    ICD-10-CM ICD-9-CM   1. Scapular dyskinesis  G25.89 781.3   2. Chronic right shoulder pain  M25.511 719.41    G89.29 338.29   3. Decreased ROM of right shoulder  M25.611 719.51       Subjective Evaluation    History of Present Illness    Subjective comment: Pt has 4/10 pain today in right scapula region.  Pt cont to have a burning pain in her right scap region.       Objective   See Exercise, Manual, and Modality Logs for complete treatment.       Assessment & Plan       Assessment  Assessment details: Pt tx today consisted of mh and estim to right shld followed by exercises for improved postural stability and shld mobility and ended with stm to right UT and ice/estim to right inferior scap region.  Pt demonstrated good effort and responded well to added reps and UE bike. Pt reported 2/10 post pain.    Plan  Plan details: Will follow progressing shld mobility and stability.          Timed:         Manual Therapy:    12     mins  40094;     Therapeutic Exercise:    29     mins  83776;     Neuromuscular Ana Luisa:        mins  16328;    Therapeutic Activity:          mins  13808;     Gait Training:           mins  95486;     Ultrasound:          mins  01201;    Ionto                                   mins   36417  Self Care                            mins   63965  Canalith Repos         mins 73283      Un-Timed:  Electrical Stimulation:    10     mins  88995 ( );  Dry Needling          mins self-pay  Traction          mins 16235      Timed Treatment:   41   mins   Total Treatment:     51   mins    Gael Medley PT  [place] License: [id number]      Electronically signed by Gael Medley PT, 10/10/23, 11:00 AM EDT

## 2023-10-12 ENCOUNTER — OFFICE VISIT (OUTPATIENT)
Dept: ORTHOPEDIC SURGERY | Facility: CLINIC | Age: 23
End: 2023-10-12
Payer: COMMERCIAL

## 2023-10-12 ENCOUNTER — TREATMENT (OUTPATIENT)
Dept: PHYSICAL THERAPY | Facility: CLINIC | Age: 23
End: 2023-10-12
Payer: COMMERCIAL

## 2023-10-12 ENCOUNTER — HOSPITAL ENCOUNTER (OUTPATIENT)
Dept: GENERAL RADIOLOGY | Facility: HOSPITAL | Age: 23
Discharge: HOME | End: 2023-10-12
Admitting: PHYSICIAN ASSISTANT
Payer: COMMERCIAL

## 2023-10-12 VITALS — BODY MASS INDEX: 29.44 KG/M2 | HEIGHT: 62 IN | WEIGHT: 160 LBS

## 2023-10-12 DIAGNOSIS — M25.511 CHRONIC RIGHT SHOULDER PAIN: ICD-10-CM

## 2023-10-12 DIAGNOSIS — G89.29 CHRONIC RIGHT SHOULDER PAIN: ICD-10-CM

## 2023-10-12 DIAGNOSIS — G25.89 SCAPULAR DYSKINESIS: Primary | ICD-10-CM

## 2023-10-12 DIAGNOSIS — M54.2 CERVICALGIA: Primary | ICD-10-CM

## 2023-10-12 DIAGNOSIS — M25.611 DECREASED ROM OF RIGHT SHOULDER: ICD-10-CM

## 2023-10-12 DIAGNOSIS — G25.89 SCAPULAR DYSKINESIS: ICD-10-CM

## 2023-10-12 PROCEDURE — 72040 X-RAY EXAM NECK SPINE 2-3 VW: CPT

## 2023-10-12 PROCEDURE — 99213 OFFICE O/P EST LOW 20 MIN: CPT | Performed by: PHYSICIAN ASSISTANT

## 2023-10-12 NOTE — PROGRESS NOTES
[facility] New Patient Visit          Patient: Lyudmila Smith  YOB: 2000  Date of Encounter: 10/12/2023  PCP: Cheryl Girard PA      Subjective     Chief Complaint   Patient presents with    Right Shoulder - Follow-up, Pain           History of Present Illness:     Lyudmila Smith is a 23 y.o. female presents today for follow-up evaluation periscapular posterior right shoulder pain.  Patient has had some progression into the paracervical region with painful range of motion of the neck progressing pain down into the right upper extremity.  She reports that she has began the conservative treatment options of physical therapy and she does report some improvement of the periscapular pain however the previously mentioned symptoms have been more of the predominant complaint today.  She reports dull throbbing aching sensation and popping sensation upon repetitive range of motion and activity.  Denies any paresthesias        There is no problem list on file for this patient.    Past Medical History:   Diagnosis Date    Rotator cuff syndrome June of 2022    It's been roughly a year and a half since it started.     History reviewed. No pertinent surgical history.  Social History     Occupational History    Not on file   Tobacco Use    Smoking status: Never    Smokeless tobacco: Never   Vaping Use    Vaping Use: Never used   Substance and Sexual Activity    Alcohol use: Not Currently     Comment: 4 months sober    Drug use: Never    Sexual activity: Yes     Partners: Male     Birth control/protection: Condom, Patch    Lyudmila Smith  reports that she has never smoked. She has never used smokeless tobacco.. I have educated her on the risk of diseases from using tobacco products such as cancer, COPD, and heart disease.           Social History     Social History Narrative    Not on file     History reviewed. No pertinent family history.  Current Outpatient Medications   Medication Sig Dispense Refill     "methocarbamol (ROBAXIN) 750 MG tablet       naproxen (NAPROSYN) 500 MG tablet Take 1 tablet by mouth 2 (Two) Times a Day With Meals. 60 tablet 2    Zafemy 150-35 MCG/24HR APPLY 1 PATCH TOPICALLY TO THE SKIN 1 TIME WEEKLY       No current facility-administered medications for this visit.     No Known Allergies         Review of Systems   Constitutional: Negative.   HENT: Negative.     Eyes: Negative.    Cardiovascular: Negative.    Respiratory: Negative.     Endocrine: Negative.    Hematologic/Lymphatic: Negative.    Skin: Negative.    Musculoskeletal:         Pertinent positives listed in HPI   Gastrointestinal: Negative.    Genitourinary: Negative.    Neurological: Negative.    Psychiatric/Behavioral:  Positive for depression. The patient has insomnia and is nervous/anxious.    Allergic/Immunologic: Negative.          Objective      Vitals:    10/12/23 1006   Weight: 72.6 kg (160 lb)   Height: 157.5 cm (62\")      BMI is >= 25 and <30. (Overweight) The following options were offered after discussion;: exercise counseling/recommendations and nutrition counseling/recommendations      Physical Exam  Vitals and nursing note reviewed.   Constitutional:       General: She is not in acute distress.     Appearance: She is not ill-appearing.   HENT:      Head: Normocephalic and atraumatic.      Right Ear: External ear normal.      Left Ear: External ear normal.      Nose: Nose normal. No congestion or rhinorrhea.   Eyes:      Extraocular Movements: Extraocular movements intact.      Conjunctiva/sclera: Conjunctivae normal.      Pupils: Pupils are equal, round, and reactive to light.   Cardiovascular:      Rate and Rhythm: Normal rate.      Pulses: Normal pulses.   Pulmonary:      Effort: Pulmonary effort is normal. No respiratory distress.      Breath sounds: No stridor.   Abdominal:      General: There is no distension.   Musculoskeletal:      Cervical back: Normal range of motion. Spasms, tenderness, bony tenderness and " crepitus present. No swelling. Pain with movement present. Decreased range of motion.   Skin:     General: Skin is warm and dry.      Capillary Refill: Capillary refill takes less than 2 seconds.   Neurological:      General: No focal deficit present.      Mental Status: She is alert and oriented to person, place, and time.   Psychiatric:         Mood and Affect: Mood normal.         Behavior: Behavior normal.         Thought Content: Thought content normal.         Judgment: Judgment normal.     Right Shoulder Exam     Tenderness   The patient is experiencing tenderness in the acromioclavicular joint (Medial inferior scapular border).    Range of Motion   Active abduction:  abnormal   Passive abduction:  abnormal   Extension:  normal   External rotation:  abnormal   Forward flexion:  abnormal   Internal rotation 0 degrees:  Mid thoracic (Scapular winging)     Muscle Strength   Abduction: 5/5   Internal rotation: 5/5   External rotation: 5/5   Supraspinatus: 5/5   Subscapularis: 5/5   Biceps: 5/5     Tests   Apprehension: positive  Lopez test: positive  Cross arm: negative  Impingement: positive  Drop arm: negative  Sulcus: absent    Other   Erythema: absent  Scars: absent  Sensation: normal  Pulse: present    Comments:  Crepitus and scapular dyskinesis with attempted forward elevation and internal rotation.  Scapular winging present.  Neurovascular status grossly intact right upper extremity                      Radiology:      XR Spine Cervical 2 or 3 View    Result Date: 10/12/2023    No acute findings in the cervical spine.   This report was finalized on 10/12/2023 11:08 AM by Dr. Josh Hart MD.      MRI Shoulder Right Without Contrast    Result Date: 8/11/2023  1.  No acute osseous or articular abnormality. 2.  No acute rotator cuff pathology. 3.  No acute labral pathology. 4.  No bone contusion or fracture.  This report was finalized on 8/11/2023 10:17 AM by Dr. Horace Flores MD.      XR Shoulder 2+ View  Right    Result Date: 7/26/2023    No acute findings in the right shoulder.  This report was finalized on 7/26/2023 4:11 PM by Dr. Reginaldo Spicer MD.             Assessment/Plan        ICD-10-CM ICD-9-CM   1. Cervicalgia  M54.2 723.1   2. Scapular dyskinesis  G25.89 781.3       23-year-old female with notable scapular dyskinesis.  The patient was provided information yielding no direct surgical indication with MRI revealing no acute osseous or articular abnormality.  Rotator cuff pathology is ruled out.  There is no acute labral pathology.  Patient will continue work with formal outpatient physical therapy and there will be in addition secondary to the patient's loss of cervical lordosis and exacerbated of paracervical pain.  The patient implement modalities as deemed appropriate to regain normal cervical lordosis and reduction of her cervical radiculopathy.  As result of this the patient will return back in 4 weeks for further evaluation.  She will continue with current NSAID usage and activity modification.          This document was signed by Damon Painter PA-C 10/12/2023    CC: Cheryl Girard PA      Dictated Utilizing Dragon Dictation:   Please note that portions of this note were completed with a voice recognition program.   Part of this note may be an electronic transcription/translation of spoken language to printed text using the Dragon Dictation System.

## 2023-10-12 NOTE — PROGRESS NOTES
Physical Therapy Daily Treatment Note      Patient: Lyudmila Smith   : 2000  Referring practitioner: CORINNE Mcadams  Date of Initial Visit: Type: THERAPY  Noted: 2023  Today's Date: 10/12/2023  Patient seen for 5 sessions       Visit Diagnoses:    ICD-10-CM ICD-9-CM   1. Scapular dyskinesis  G25.89 781.3   2. Chronic right shoulder pain  M25.511 719.41    G89.29 338.29   3. Decreased ROM of right shoulder  M25.611 719.51       Subjective Evaluation    History of Present Illness    Subjective comment: Pt has 3/10 pain today.       Objective   See Exercise, Manual, and Modality Logs for complete treatment.       Assessment & Plan       Assessment  Assessment details: Pt tx today consisted stm to right UT followed by exercises for improved postural stability and shld mobility and ended with ice/estim to right inferior scap region.  Pt demonstrated good effort and responded well to added resistance with rtb today. Pt reported 2/10 post pain.    Plan  Plan details: Will follow progressing shld mobility and stability.      Timed:         Manual Therapy:    12     mins  11886;     Therapeutic Exercise:    27     mins  32583;     Neuromuscular Ana Luisa:        mins  66916;    Therapeutic Activity:          mins  56182;     Gait Training:           mins  52411;     Ultrasound:          mins  31053;    Ionto                                   mins   98737  Self Care                            mins   16954  Canalith Repos         mins 93968      Un-Timed:  Electrical Stimulation:    10     mins  17803 ( );  Dry Needling          mins self-pay  Traction          mins 08589      Timed Treatment:   39   mins   Total Treatment:     49   mins    Gael Medley PT  [place] License: [id number]      Electronically signed by Gael Medley PT, 10/12/23, 1:01 PM EDT

## 2023-10-17 ENCOUNTER — TREATMENT (OUTPATIENT)
Dept: PHYSICAL THERAPY | Facility: CLINIC | Age: 23
End: 2023-10-17
Payer: COMMERCIAL

## 2023-10-17 DIAGNOSIS — M25.511 CHRONIC RIGHT SHOULDER PAIN: ICD-10-CM

## 2023-10-17 DIAGNOSIS — G25.89 SCAPULAR DYSKINESIS: Primary | ICD-10-CM

## 2023-10-17 DIAGNOSIS — G89.29 CHRONIC RIGHT SHOULDER PAIN: ICD-10-CM

## 2023-10-17 DIAGNOSIS — M25.611 DECREASED ROM OF RIGHT SHOULDER: ICD-10-CM

## 2023-10-17 PROCEDURE — 97014 ELECTRIC STIMULATION THERAPY: CPT | Performed by: PHYSICAL THERAPIST

## 2023-10-17 PROCEDURE — 97110 THERAPEUTIC EXERCISES: CPT | Performed by: PHYSICAL THERAPIST

## 2023-10-17 PROCEDURE — 97140 MANUAL THERAPY 1/> REGIONS: CPT | Performed by: PHYSICAL THERAPIST

## 2023-10-17 NOTE — PROGRESS NOTES
Physical Therapy Daily Treatment Note      Patient: Lyudmila Smith   : 2000  Referring practitioner: CORINNE Mcadams  Date of Initial Visit: Type: THERAPY  Noted: 2023  Today's Date: 10/17/2023  Patient seen for 6 sessions       Visit Diagnoses:    ICD-10-CM ICD-9-CM   1. Scapular dyskinesis  G25.89 781.3   2. Chronic right shoulder pain  M25.511 719.41    G89.29 338.29   3. Decreased ROM of right shoulder  M25.611 719.51       Subjective: Patient reports 5/10 right shoulder pain prior to tx. Patient states she had difficulty sleeping last night due to her shoulder bothering her.     Objective   See Exercise, Manual, and Modality Logs for complete treatment.       Assessment/Plan: Today's therapy session consisted of manual rx f/b therex per flow sheet and modalities at conclusion. Manual therapy performed to right UT and medial scapular border to address tightness and assist with pain control. Therex completed with continued focus on improved shoulder range of motion, improved scapular stability and strength. Activities progressed with sternal lifts initiated. Pt noted with good tolerance and was provided with cues and demonstration for form. Pt reported 4-5/10 pain post tx. Pt will be progressed with therapy as tolerated to address goals, reduce pain and improve function. No adverse reactions observed during, and/ or following tx. Continue with PT's POC.       Timed:         Manual Therapy:    14     mins  26940;     Therapeutic Exercise:   30      mins  46452;     Neuromuscular Ana Luisa:        mins  97054;    Therapeutic Activity:          mins  77369;     Gait Training:           mins  41151;     Ultrasound:          mins  25772;    Ionto                                   mins   49311  Self Care                            mins   84382      Un-Timed:  Electrical Stimulation:    10     mins  44821 ( );  Dry Needling          mins self-pay  Traction          mins 20067  Canalith Repos         mins  16771    Timed Treatment:   44   mins   Total Treatment:    54    mins    Ranjana Philippe. DALLIN Carreon  [place] License: [id number]

## 2023-10-19 ENCOUNTER — TREATMENT (OUTPATIENT)
Dept: PHYSICAL THERAPY | Facility: CLINIC | Age: 23
End: 2023-10-19
Payer: COMMERCIAL

## 2023-10-19 DIAGNOSIS — G25.89 SCAPULAR DYSKINESIS: Primary | ICD-10-CM

## 2023-10-19 DIAGNOSIS — G89.29 CHRONIC RIGHT SHOULDER PAIN: ICD-10-CM

## 2023-10-19 DIAGNOSIS — M25.511 CHRONIC RIGHT SHOULDER PAIN: ICD-10-CM

## 2023-10-19 DIAGNOSIS — M25.611 DECREASED ROM OF RIGHT SHOULDER: ICD-10-CM

## 2023-10-19 NOTE — PROGRESS NOTES
Physical Therapy Daily Treatment Note      Patient: Lyudmila Smith   : 2000  Referring practitioner: CORINNE Mcadams  Date of Initial Visit: Type: THERAPY  Noted: 2023  Today's Date: 10/19/2023  Patient seen for 7 sessions       Visit Diagnoses:    ICD-10-CM ICD-9-CM   1. Scapular dyskinesis  G25.89 781.3   2. Chronic right shoulder pain  M25.511 719.41    G89.29 338.29   3. Decreased ROM of right shoulder  M25.611 719.51       Subjective Evaluation    History of Present Illness    Subjective comment: Pt reports having 6/10 pain today.     Objective   See Exercise, Manual, and Modality Logs for complete treatment.       Assessment & Plan       Assessment  Assessment details: Tx today consisted of exercises for improved postural stability and shoulder mobility; followed by stm to right shld and UT region and ended with mh and estim for decreased pain.  Pt demonstrated fair effort today with mild distress and reported 4/10 post pain.    Plan  Plan details: Will follow progressing shoulder mobility and decreased pain.          Timed:         Manual Therapy:    12     mins  93815;     Therapeutic Exercise:    31     mins  62389;     Neuromuscular Ana Luisa:        mins  25332;    Therapeutic Activity:          mins  62708;     Gait Training:           mins  64479;     Ultrasound:          mins  71219;    Ionto                                   mins   14252  Self Care                            mins   39305  Canalith Repos         mins 71027      Un-Timed:  Electrical Stimulation:    10     mins  17488 ( );  Dry Needling          mins self-pay  Traction          mins 33404      Timed Treatment:   43   mins   Total Treatment:     53   mins    Gael Medley PT  [place] License: [id number]      Electronically signed by Gael Medley PT, 10/19/23, 11:09 AM EDT

## 2023-10-24 ENCOUNTER — TREATMENT (OUTPATIENT)
Dept: PHYSICAL THERAPY | Facility: CLINIC | Age: 23
End: 2023-10-24
Payer: COMMERCIAL

## 2023-10-24 DIAGNOSIS — M25.611 DECREASED ROM OF RIGHT SHOULDER: ICD-10-CM

## 2023-10-24 DIAGNOSIS — M25.511 CHRONIC RIGHT SHOULDER PAIN: ICD-10-CM

## 2023-10-24 DIAGNOSIS — M54.2 CERVICALGIA: ICD-10-CM

## 2023-10-24 DIAGNOSIS — G25.89 SCAPULAR DYSKINESIS: Primary | ICD-10-CM

## 2023-10-24 DIAGNOSIS — G89.29 CHRONIC RIGHT SHOULDER PAIN: ICD-10-CM

## 2023-10-24 PROCEDURE — 97110 THERAPEUTIC EXERCISES: CPT | Performed by: PHYSICAL THERAPIST

## 2023-10-24 PROCEDURE — 97164 PT RE-EVAL EST PLAN CARE: CPT | Performed by: PHYSICAL THERAPIST

## 2023-10-24 PROCEDURE — 97140 MANUAL THERAPY 1/> REGIONS: CPT | Performed by: PHYSICAL THERAPIST

## 2023-10-24 PROCEDURE — 97014 ELECTRIC STIMULATION THERAPY: CPT | Performed by: PHYSICAL THERAPIST

## 2023-10-24 NOTE — PROGRESS NOTES
Physical Therapy Re Certification Of Plan of Care  Patient: Lyudmila Smith   : 2000  Diagnosis/ICD-10 Code:  Scapular dyskinesis [G25.89]  Referring practitioner: CORINNE Mcadams  Date of Initial Visit: Type: THERAPY  Noted: 2023  Today's Date: 10/24/2023  Patient seen for 8 sessions         Visit Diagnoses:    ICD-10-CM ICD-9-CM   1. Scapular dyskinesis  G25.89 781.3   2. Chronic right shoulder pain  M25.511 719.41    G89.29 338.29   3. Decreased ROM of right shoulder  M25.611 719.51   4. Cervicalgia  M54.2 723.1         Lyudmila Smith reports:   Subjective Questionnaire:   Clinical Progress: unchanged  Home Program Compliance: Yes  Treatment has included: therapeutic exercise, neuromuscular re-education, manual therapy, therapeutic activity, gait training, electrical stimulation, ultrasound, traction, moist heat, and cryotherapy      Subjective Evaluation    History of Present Illness  Mechanism of injury: Pt has suffered from neck pain for the past six months.  The patient has recently been evaluated and advised to receive therapy for treatment of neck pain. She reports having pain that radiated into the right hand into the thumb.    Subjective comment: Pt reports her MD wants her to be evaluated for treatmetn of neck pain.Pain  Current pain ratin  At best pain ratin  At worst pain ratin  Location: neck             Objective          Postural Observations    Additional Postural Observation Details  Slumped posture with fwd head    Tenderness     Additional Tenderness Details  Right UT and C4-C7 sp tenderness    Neurological Testing     Sensation   Cervical/Thoracic   Left   Intact: light touch    Right   Diminished: light touch    Active Range of Motion   Cervical/Thoracic Spine   Cervical    Flexion: 35 degrees   Extension: 25 degrees   Left lateral flexion: 30 degrees   Right lateral flexion: 30 degrees   Left rotation: 45 degrees   Right rotation: 40 degrees     Strength/Myotome Testing      Left Shoulder     Planes of Motion   Flexion: 4+     Right Shoulder     Planes of Motion   Flexion: 3+     Left Elbow   Flexion: 4+  Extension: 4+    Right Elbow   Flexion: 3+  Extension: 3+    Tests   Cervical   Positive repeated flexion.  Negative repeated extension.     Left   Negative Spurling's sign.     Right   Negative Spurling's sign.           Assessment & Plan       Assessment  Impairments: abnormal coordination, abnormal muscle firing, abnormal muscle tone, abnormal or restricted ROM, activity intolerance, impaired physical strength, lacks appropriate home exercise program, pain with function and safety issue   Functional limitations: carrying objects, lifting, sleeping, pulling, pushing, uncomfortable because of pain, reaching behind back, reaching overhead and unable to perform repetitive tasks   Assessment details: Pt is a 24 y/o female referred to therapy for treatment of right shoulder pain and scapular dyskinesis and now re-evaluated for treatment of cervicalgia.  Pt cont to report increased pain, yet has noted improved tolerance to exercises.  Pt presented today with increased pain along the right UT and reported pain down the right arm with repeated cervical flexion.  Pt instructed on Nigel program; followed by stm to right UT and mh and estim.  Pt reported 3/10 post pain.  Prognosis: good    Goals  Plan Goals: STG 6 weeks    1 Pt will be instructed in a HEP.met  2 Pt will improve her Quick Dash to less than 40%.NT  3 Pt will improve her right shoulder AROM to 120 degrees.limited due to pain    LTG 12 weeks    1 Pt will improve Quick Dash to less than 20%.NT  2 Pt will report pain no greater than 4/10 with moderate lifting.not met  3 Pt will demonstrate 140 degrees of AROM for right shoulder flexion.limited due to pain today  4 Pt will demonstrate 4/5 gross right UE strength.partially met  5 Pt will improve bilateral rotation to 50 degrees.New  6 Pt will report a 75% decrease in radicular  pain.    Plan  Therapy options: will be seen for skilled therapy services  Planned modality interventions: cryotherapy, TENS, thermotherapy (hydrocollator packs) and ultrasound  Planned therapy interventions: ADL retraining, body mechanics training, flexibility, functional ROM exercises, home exercise program, IADL retraining, joint mobilization, manual therapy, neuromuscular re-education, postural training, soft tissue mobilization, strengthening, stretching and therapeutic activities  Frequency: 2x week  Duration in weeks: 8  Treatment plan discussed with: patient  Plan details: Will follow for optimal gains.    Moderate Evaluation  54100    Re-evaluation   21577      Therapeutic exercise  76269  Therapeutic activity    23685  Neuromuscular re-education   03730  Manual therapy   51296  Gait training  80027  Unattended e-stim (Medicaid/Medicare)     Moist heat/cryotherapy 26965   Ultrasound   37736               Recommendations: Continue as planned  Timeframe: 2 months  Prognosis to achieve goals: good      Timed:         Manual Therapy:    12     mins  13931;     Therapeutic Exercise:    14     mins  33203;     Neuromuscular Ana Luisa:        mins  55962;    Therapeutic Activity:          mins  88780;     Gait Training:           mins  66395;     Ultrasound:          mins  90568;    Ionto                                   mins   39622  Self Care                            mins   01416    Un-Timed:  Electrical Stimulation:    10     mins  89753 ( );  Dry Needling          mins self-pay  Traction          mins 07694  Re-Eval                        15       mins  55900  Canalith Repos         mins 59906    Timed Treatment:   26   mins   Total Treatment:     51   mins          PT: Gael Medley PT     [place] License:  [id number]    Electronically signed by Gael Medley PT, 10/24/23, 11:02 AM EDT    Certification Period: 10/24/2023 thru 1/21/2024  I certify that the therapy services are furnished while  this patient is under my care.  The services outlined above are required by this patient, and will be reviewed every 90 days.         Physician Signature:__________________________________________________    PHYSICIAN: Damon Painter PA  NPI: [id number]                                      DATE:  :     Please sign and return via fax to [email or web address] . Thank you, [facility]

## 2023-10-26 ENCOUNTER — TREATMENT (OUTPATIENT)
Dept: PHYSICAL THERAPY | Facility: CLINIC | Age: 23
End: 2023-10-26
Payer: COMMERCIAL

## 2023-10-26 DIAGNOSIS — M54.2 CERVICALGIA: ICD-10-CM

## 2023-10-26 DIAGNOSIS — M25.511 CHRONIC RIGHT SHOULDER PAIN: ICD-10-CM

## 2023-10-26 DIAGNOSIS — M25.611 DECREASED ROM OF RIGHT SHOULDER: ICD-10-CM

## 2023-10-26 DIAGNOSIS — G25.89 SCAPULAR DYSKINESIS: Primary | ICD-10-CM

## 2023-10-26 DIAGNOSIS — G89.29 CHRONIC RIGHT SHOULDER PAIN: ICD-10-CM

## 2023-10-26 NOTE — PROGRESS NOTES
Physical Therapy Daily Treatment Note      Patient: Lyudmila Smith   : 2000  Referring practitioner: CORINNE Mcadams  Date of Initial Visit: Type: THERAPY  Noted: 2023  Today's Date: 10/26/2023  Patient seen for 9 sessions       Visit Diagnoses:    ICD-10-CM ICD-9-CM   1. Scapular dyskinesis  G25.89 781.3   2. Chronic right shoulder pain  M25.511 719.41    G89.29 338.29   3. Decreased ROM of right shoulder  M25.611 719.51   4. Cervicalgia  M54.2 723.1       Subjective Evaluation    History of Present Illness    Subjective comment: Pt has 5/10 pain today.       Objective   See Exercise, Manual, and Modality Logs for complete treatment.       Assessment & Plan       Assessment  Assessment details: Tx today initiate with exercises for postural stability, shoulder mobility and centralization of cervical pain; followed by stm to right UT and cervical region and ice and estim to right UT region.  Pt demonstrated fair effort with guarding today and reported mild increased right arm pain with chin tucks.  Pt reported similar pain at 5/10 post tx.    Plan  Plan details: Will follow progressing core stability and decreased pain.          Timed:         Manual Therapy:    12     mins  87022;     Therapeutic Exercise:    27     mins  36087;     Neuromuscular Ana Luisa:        mins  53673;    Therapeutic Activity:          mins  17726;     Gait Training:           mins  53156;     Ultrasound:          mins  98283;    Ionto                                   mins   00686  Self Care                            mins   39191  Canalith Repos         mins 63524      Un-Timed:  Electrical Stimulation:    10     mins  36848 ( );  Dry Needling          mins self-pay  Traction          mins 14257      Timed Treatment:   39   mins   Total Treatment:     49   mins    Gael Medley PT  [place] License: [id number]      Electronically signed by Gael Medley PT, 10/26/23, 11:02 AM EDT

## 2023-10-31 ENCOUNTER — TREATMENT (OUTPATIENT)
Dept: PHYSICAL THERAPY | Facility: CLINIC | Age: 23
End: 2023-10-31
Payer: COMMERCIAL

## 2023-10-31 DIAGNOSIS — G89.29 CHRONIC RIGHT SHOULDER PAIN: ICD-10-CM

## 2023-10-31 DIAGNOSIS — M54.2 CERVICALGIA: ICD-10-CM

## 2023-10-31 DIAGNOSIS — G25.89 SCAPULAR DYSKINESIS: Primary | ICD-10-CM

## 2023-10-31 DIAGNOSIS — M25.511 CHRONIC RIGHT SHOULDER PAIN: ICD-10-CM

## 2023-10-31 DIAGNOSIS — M25.611 DECREASED ROM OF RIGHT SHOULDER: ICD-10-CM

## 2023-10-31 NOTE — PROGRESS NOTES
Physical Therapy Daily Treatment Note/Discharge      Patient: Lyudmila Smith   : 2000  Referring practitioner: CORINNE Mcadams  Date of Initial Visit: Type: THERAPY  Noted: 2023  Today's Date: 10/31/2023  Patient seen for 10 sessions       Visit Diagnoses:    ICD-10-CM ICD-9-CM   1. Scapular dyskinesis  G25.89 781.3   2. Chronic right shoulder pain  M25.511 719.41    G89.29 338.29   3. Decreased ROM of right shoulder  M25.611 719.51   4. Cervicalgia  M54.2 723.1       Subjective Evaluation    History of Present Illness    Subjective comment: Pt reports she understands her exercies and will cont with her HEP.  Pt has 5/10 pain today.       Objective   See Exercise, Manual, and Modality Logs for complete treatment.       Assessment & Plan       Assessment  Assessment details: Tx today consisted of exercises and review of HEP; followed by stm to right UT and ended with ice and estim for decreased pain.  Pt demonstrated good understanding of HEP today with min cues for mech.  Pt has met max gains with therapy with reports of no significant gains with therapy.  Pt reported 3/10 post pain.    Plan  Plan details: Pt will be discharged with HEP.  Pt instructed to contact therapy as needed.  Pt will follow up with her MD as needed.          Timed:         Manual Therapy:    12     mins  23803;     Therapeutic Exercise:    31     mins  51481;     Neuromuscular Ana Luisa:        mins  98952;    Therapeutic Activity:          mins  16749;     Gait Training:           mins  43965;     Ultrasound:          mins  71084;    Ionto                                   mins   56059  Self Care                            mins   06353  Canalith Repos         mins 43747      Un-Timed:  Electrical Stimulation:    10     mins  62120 ( );  Dry Needling          mins self-pay  Traction          mins 35432      Timed Treatment:   43   mins   Total Treatment:     53   mins    Gael Medley PT  [place] License:  [id number]      Electronically signed by Gael Medley, PT, 10/31/23, 11:04 AM EDT

## 2023-11-09 ENCOUNTER — OFFICE VISIT (OUTPATIENT)
Dept: ORTHOPEDIC SURGERY | Facility: CLINIC | Age: 23
End: 2023-11-09
Payer: COMMERCIAL

## 2023-11-09 VITALS — HEIGHT: 62 IN | WEIGHT: 160 LBS | BODY MASS INDEX: 29.44 KG/M2

## 2023-11-09 DIAGNOSIS — G25.89 SCAPULAR DYSKINESIS: ICD-10-CM

## 2023-11-09 DIAGNOSIS — M54.2 CERVICALGIA: Primary | ICD-10-CM

## 2023-11-09 PROCEDURE — 99213 OFFICE O/P EST LOW 20 MIN: CPT | Performed by: PHYSICIAN ASSISTANT

## 2023-11-09 RX ORDER — PRAZOSIN HYDROCHLORIDE 1 MG/1
CAPSULE ORAL
COMMUNITY
Start: 2023-10-30

## 2023-11-09 RX ORDER — DULOXETIN HYDROCHLORIDE 20 MG/1
CAPSULE, DELAYED RELEASE ORAL
COMMUNITY
Start: 2023-10-31

## 2023-11-09 NOTE — PROGRESS NOTES
[facility] [place]          Patient: Lyudmila Smith  YOB: 2000  Date of Encounter: 11/9/2023  PCP: Cheryl Girard PA      Subjective     Chief Complaint   Patient presents with    Right Shoulder - Follow-up, Pain           History of Present Illness:     Lyudmila Smith is a 23 y.o. female presents today for follow-up evaluation periscapular posterior right shoulder pain.  Patient has had some progression into the paracervical region with painful range of motion of the neck progressing pain down into the right upper extremity.  She reports that she has continued the conservative treatment options of physical therapy with no significant improvement of the neck pain and upper extremity numbness.  She has been discharged from formal physical therapy as result of no significant alleviation following a 6-week course.  She reports dull throbbing aching sensation and popping sensation upon repetitive range of motion and activity.  Denies any paresthesias        There is no problem list on file for this patient.    Past Medical History:   Diagnosis Date    Rotator cuff syndrome June of 2022    It's been roughly a year and a half since it started.     History reviewed. No pertinent surgical history.  Social History     Occupational History    Not on file   Tobacco Use    Smoking status: Never    Smokeless tobacco: Never   Vaping Use    Vaping Use: Never used   Substance and Sexual Activity    Alcohol use: Not Currently     Comment: 4 months sober    Drug use: Never    Sexual activity: Yes     Partners: Male     Birth control/protection: Condom, Patch    Lyudmila Smith  reports that she has never smoked. She has never used smokeless tobacco.. I have educated her on the risk of diseases from using tobacco products such as cancer, COPD, and heart disease.           Social History     Social History Narrative    Not on file     History reviewed. No pertinent family history.  Current Outpatient  "Medications   Medication Sig Dispense Refill    DULoxetine (CYMBALTA) 20 MG capsule       methocarbamol (ROBAXIN) 750 MG tablet       naproxen (NAPROSYN) 500 MG tablet Take 1 tablet by mouth 2 (Two) Times a Day With Meals. 60 tablet 2    prazosin (MINIPRESS) 1 MG capsule       Zafemy 150-35 MCG/24HR APPLY 1 PATCH TOPICALLY TO THE SKIN 1 TIME WEEKLY       No current facility-administered medications for this visit.     No Known Allergies         Review of Systems   Constitutional: Negative.   HENT: Negative.     Eyes: Negative.    Cardiovascular: Negative.    Respiratory: Negative.     Endocrine: Negative.    Hematologic/Lymphatic: Negative.    Skin: Negative.    Musculoskeletal:         Pertinent positives listed in HPI   Gastrointestinal: Negative.    Genitourinary: Negative.    Neurological: Negative.    Psychiatric/Behavioral:  Positive for depression. The patient has insomnia and is nervous/anxious.    Allergic/Immunologic: Negative.          Objective      Vitals:    11/09/23 1049   Weight: 72.6 kg (160 lb)   Height: 157.5 cm (62\")      BMI is >= 25 and <30. (Overweight) The following options were offered after discussion;: exercise counseling/recommendations and nutrition counseling/recommendations      Physical Exam  Vitals and nursing note reviewed.   Constitutional:       General: She is not in acute distress.     Appearance: She is not ill-appearing.   HENT:      Head: Normocephalic and atraumatic.      Right Ear: External ear normal.      Left Ear: External ear normal.      Nose: Nose normal. No congestion or rhinorrhea.   Eyes:      Extraocular Movements: Extraocular movements intact.      Conjunctiva/sclera: Conjunctivae normal.      Pupils: Pupils are equal, round, and reactive to light.   Cardiovascular:      Rate and Rhythm: Normal rate.      Pulses: Normal pulses.   Pulmonary:      Effort: Pulmonary effort is normal. No respiratory distress.      Breath sounds: No stridor.   Abdominal:      General: " There is no distension.   Musculoskeletal:      Cervical back: Spasms, tenderness, bony tenderness and crepitus present. No swelling. Pain with movement present. Decreased range of motion.   Skin:     General: Skin is warm and dry.      Capillary Refill: Capillary refill takes less than 2 seconds.   Neurological:      General: No focal deficit present.      Mental Status: She is alert and oriented to person, place, and time.   Psychiatric:         Mood and Affect: Mood normal.         Behavior: Behavior normal.         Thought Content: Thought content normal.         Judgment: Judgment normal.     Back Exam     Tenderness   The patient is experiencing tenderness in the cervical.    Range of Motion   Extension:  abnormal   Flexion:  abnormal   Lateral bend right:  abnormal   Lateral bend left:  abnormal   Rotation right:  abnormal   Rotation left:  abnormal     Muscle Strength   The patient has normal back strength.    Other   Sensation: decreased (C6 nerve root distribution)      Right Shoulder Exam     Tenderness   The patient is experiencing tenderness in the acromioclavicular joint (Medial inferior scapular border).    Range of Motion   Active abduction:  abnormal   Passive abduction:  abnormal   Extension:  normal   External rotation:  abnormal   Forward flexion:  abnormal   Internal rotation 0 degrees:  Mid thoracic (Scapular winging)     Muscle Strength   Abduction: 5/5   Internal rotation: 5/5   External rotation: 5/5   Supraspinatus: 5/5   Subscapularis: 5/5   Biceps: 5/5     Tests   Apprehension: positive  Lopez test: positive  Cross arm: negative  Impingement: positive  Drop arm: negative  Sulcus: absent    Other   Erythema: absent  Scars: absent  Sensation: normal  Pulse: present    Comments:  Crepitus and scapular dyskinesis with attempted forward elevation and internal rotation.  Scapular winging present.  Neurovascular status grossly intact right upper extremity                      Radiology:       XR Spine Cervical 2 or 3 View    Result Date: 10/12/2023    No acute findings in the cervical spine.   This report was finalized on 10/12/2023 11:08 AM by Dr. Josh Hart MD.      MRI Shoulder Right Without Contrast    Result Date: 8/11/2023  1.  No acute osseous or articular abnormality. 2.  No acute rotator cuff pathology. 3.  No acute labral pathology. 4.  No bone contusion or fracture.  This report was finalized on 8/11/2023 10:17 AM by Dr. Horace Flores MD.             Assessment/Plan        ICD-10-CM ICD-9-CM   1. Cervicalgia  M54.2 723.1   2. Scapular dyskinesis  G25.89 781.3       23-year-old female with notable scapular dyskinesis and cervical radicular pain symptoms.  The patient was provided information yielding no direct surgical indication with MRI revealing no acute osseous or articular abnormality of the shoulder proper. Rotator cuff pathology is ruled out.  There is no acute labral pathology.  Patient will implement EMG/nerve conduction studies bilateral upper extremities at the discretion and recommendation of physical therapy as well as with continuation of pain and symptoms consistent with cervical radiculopathy notably the C6 nerve root distribution.  Patient also has continued parascapular region pain symptoms to suggest involvement.  The patient will return back upon completion of EMG/nerve conduction studies bilateral upper extremities.       This document was signed by Damon Painter PA-C 11/19/2023    CC: Cheryl Girard PA      Dictated Utilizing Dragon Dictation:   Please note that portions of this note were completed with a voice recognition program.   Part of this note may be an electronic transcription/translation of spoken language to printed text using the Dragon Dictation System.       home

## 2023-12-27 ENCOUNTER — PROCEDURE VISIT (OUTPATIENT)
Dept: ORTHOPEDIC SURGERY | Facility: CLINIC | Age: 23
End: 2023-12-27
Payer: COMMERCIAL

## 2023-12-27 DIAGNOSIS — R20.2 PARESTHESIA: ICD-10-CM

## 2023-12-27 DIAGNOSIS — M54.2 CERVICALGIA: ICD-10-CM

## 2023-12-27 DIAGNOSIS — M79.601 RIGHT UPPER LIMB PAIN: Primary | ICD-10-CM

## 2024-01-09 ENCOUNTER — OFFICE VISIT (OUTPATIENT)
Dept: ORTHOPEDIC SURGERY | Facility: CLINIC | Age: 24
End: 2024-01-09
Payer: COMMERCIAL

## 2024-01-09 VITALS — WEIGHT: 168 LBS | BODY MASS INDEX: 30.91 KG/M2 | HEIGHT: 62 IN

## 2024-01-09 DIAGNOSIS — M54.2 CERVICALGIA: Primary | ICD-10-CM

## 2024-01-09 DIAGNOSIS — G25.89 SCAPULAR DYSKINESIS: ICD-10-CM

## 2024-01-09 PROCEDURE — 99213 OFFICE O/P EST LOW 20 MIN: CPT | Performed by: PHYSICIAN ASSISTANT

## 2024-01-09 RX ORDER — CHOLECALCIFEROL (VITAMIN D3) 1250 MCG
1 CAPSULE ORAL WEEKLY
COMMUNITY
Start: 2023-12-29

## 2024-01-09 RX ORDER — DIAZEPAM 2 MG/1
2 TABLET ORAL AS NEEDED
COMMUNITY
Start: 2023-11-27

## 2024-01-09 RX ORDER — BUSPIRONE HYDROCHLORIDE 10 MG/1
10 TABLET ORAL 3 TIMES DAILY
COMMUNITY
Start: 2024-01-04

## 2024-01-09 RX ORDER — HYDROXYZINE PAMOATE 25 MG/1
25 CAPSULE ORAL
COMMUNITY
Start: 2024-01-08

## 2024-01-09 RX ORDER — DULOXETIN HYDROCHLORIDE 30 MG/1
30 CAPSULE, DELAYED RELEASE ORAL 2 TIMES DAILY
COMMUNITY
Start: 2023-12-11

## 2024-01-23 NOTE — PROGRESS NOTES
[facility] [place] [facility] Established  Patient Visit          Patient: Lyudmila Smith  YOB: 2000  Date of Encounter: 1/9/2024  PCP: Cheryl Girard PA      Subjective     Chief Complaint   Patient presents with   • Cervical Spine - Follow-up     EMG review           History of Present Illness:     Lyudmila Smith is a 23 y.o. female presents today for follow-up evaluation periscapular posterior right shoulder/upper extremity pain.  Patient has had some progression into the paracervical region with painful range of motion of the neck progressing pain down into the right upper extremity.  She reports that she has continued the conservative treatment options of physical therapy with no significant improvement of the neck pain and upper extremity numbness.  She has undergone the EMG/NCS and presents for review of this. She reports dull throbbing aching sensation and popping sensation upon repetitive range of motion and activity.  Denies any paresthesias        There is no problem list on file for this patient.    Past Medical History:   Diagnosis Date   • Cervical disc disorder ?    Straightened neck   • Rotator cuff syndrome June of 2022    It's been roughly a year and a half since it started.     No past surgical history on file.  Social History     Occupational History   • Not on file   Tobacco Use   • Smoking status: Never   • Smokeless tobacco: Never   Vaping Use   • Vaping Use: Never used   Substance and Sexual Activity   • Alcohol use: Yes     Alcohol/week: 2.0 standard drinks of alcohol     Types: 2 Drinks containing 0.5 oz of alcohol per week     Comment: Very infrequent, usually every other week   • Drug use: Never   • Sexual activity: Yes     Partners: Male     Birth control/protection: Condom, Patch    Lyudmila Smith  reports that she has never smoked. She has never used smokeless tobacco.. I have educated her on the risk of diseases from using tobacco products such as cancer, COPD, and heart  "disease.           Social History     Social History Narrative   • Not on file     Family History   Problem Relation Age of Onset   • Cancer Maternal Grandmother         Ovarian cancer     Current Outpatient Medications   Medication Sig Dispense Refill   • busPIRone (BUSPAR) 10 MG tablet Take 1 tablet by mouth 3 (Three) Times a Day.     • Cholecalciferol (Vitamin D3) 1.25 MG (76883 UT) capsule Take 1 capsule by mouth 1 (One) Time Per Week.     • diazePAM (VALIUM) 2 MG tablet Take 1 tablet by mouth As Needed.     • DULoxetine (CYMBALTA) 30 MG capsule Take 1 capsule by mouth 2 (Two) Times a Day.     • hydrOXYzine pamoate (VISTARIL) 25 MG capsule Take 1 capsule by mouth every night at bedtime.     • methocarbamol (ROBAXIN) 750 MG tablet      • naproxen (NAPROSYN) 500 MG tablet Take 1 tablet by mouth 2 (Two) Times a Day With Meals. 60 tablet 2   • prazosin (MINIPRESS) 1 MG capsule      • Zafemy 150-35 MCG/24HR APPLY 1 PATCH TOPICALLY TO THE SKIN 1 TIME WEEKLY       No current facility-administered medications for this visit.     No Known Allergies         Review of Systems   Constitutional: Negative.   HENT: Negative.     Eyes: Negative.    Cardiovascular: Negative.    Respiratory: Negative.     Endocrine: Negative.    Hematologic/Lymphatic: Negative.    Skin: Negative.    Musculoskeletal:         Pertinent positives listed in HPI   Gastrointestinal: Negative.    Genitourinary: Negative.    Neurological: Negative.    Psychiatric/Behavioral:  Positive for depression. The patient has insomnia and is nervous/anxious.    Allergic/Immunologic: Negative.          Objective      Vitals:    01/09/24 1318   Weight: 76.2 kg (168 lb)   Height: 157.5 cm (62\")   PainSc:   4   PainLoc: Neck      BMI is >= 25 and <30. (Overweight) The following options were offered after discussion;: exercise counseling/recommendations and nutrition counseling/recommendations      Physical Exam  Vitals and nursing note reviewed.   Constitutional:      "  General: She is not in acute distress.     Appearance: She is not ill-appearing.   HENT:      Head: Normocephalic and atraumatic.      Right Ear: External ear normal.      Left Ear: External ear normal.      Nose: Nose normal. No congestion or rhinorrhea.   Eyes:      Extraocular Movements: Extraocular movements intact.      Conjunctiva/sclera: Conjunctivae normal.      Pupils: Pupils are equal, round, and reactive to light.   Cardiovascular:      Rate and Rhythm: Normal rate.      Pulses: Normal pulses.   Pulmonary:      Effort: Pulmonary effort is normal. No respiratory distress.      Breath sounds: No stridor.   Abdominal:      General: There is no distension.   Musculoskeletal:      Cervical back: Spasms, tenderness, bony tenderness and crepitus present. No swelling. Pain with movement present. Decreased range of motion.   Skin:     General: Skin is warm and dry.      Capillary Refill: Capillary refill takes less than 2 seconds.   Neurological:      General: No focal deficit present.      Mental Status: She is alert and oriented to person, place, and time.   Psychiatric:         Mood and Affect: Mood normal.         Behavior: Behavior normal.         Thought Content: Thought content normal.         Judgment: Judgment normal.     Back Exam     Tenderness   The patient is experiencing tenderness in the cervical.    Range of Motion   Extension:  abnormal   Flexion:  abnormal   Lateral bend right:  abnormal   Lateral bend left:  abnormal   Rotation right:  abnormal   Rotation left:  abnormal     Muscle Strength   The patient has normal back strength.    Other   Sensation: decreased (C6 nerve root distribution)      Right Shoulder Exam     Tenderness   The patient is experiencing tenderness in the acromioclavicular joint (Medial inferior scapular border).    Range of Motion   Active abduction:  abnormal   Passive abduction:  abnormal   Extension:  normal   External rotation:  abnormal   Forward flexion:  abnormal    Internal rotation 0 degrees:  Mid thoracic (Scapular winging)     Muscle Strength   Abduction: 5/5   Internal rotation: 5/5   External rotation: 5/5   Supraspinatus: 5/5   Subscapularis: 5/5   Biceps: 5/5     Tests   Apprehension: positive  Lopez test: positive  Cross arm: negative  Impingement: positive  Drop arm: negative  Sulcus: absent    Other   Erythema: absent  Scars: absent  Sensation: normal  Pulse: present    Comments:  Crepitus and scapular dyskinesis with attempted forward elevation and internal rotation.  Scapular winging present.  Neurovascular status grossly intact right upper extremity                      Radiology/Procedures:                Assessment/Plan        ICD-10-CM ICD-9-CM   1. Cervicalgia  M54.2 723.1   2. Scapular dyskinesis  G25.89 781.3       23-year-old female with notable scapular dyskinesis and cervical radicular pain symptoms.  The patient was provided information yielding no direct surgical indication with MRI revealing no acute osseous or articular abnormality of the shoulder proper. Rotator cuff pathology is ruled out.  There is no acute labral pathology.  Patient has implemented EMG/nerve conduction studies revealing no surgical indication.  As result of this the patient will undergo further evaluation and referral to pain management specialist to discuss local trigger point injections and modalities to help reduce pain and symptoms associated.  Referral has been given to Dr. Mcnamara.         This document was signed by Damon Painter PA-C 1/9/2024    CC: Cheryl Girard PA      Dictated Utilizing Dragon Dictation:   Please note that portions of this note were completed with a voice recognition program.   Part of this note may be an electronic transcription/translation of spoken language to printed text using the Dragon Dictation System.